# Patient Record
Sex: FEMALE | Race: WHITE | URBAN - NONMETROPOLITAN AREA
[De-identification: names, ages, dates, MRNs, and addresses within clinical notes are randomized per-mention and may not be internally consistent; named-entity substitution may affect disease eponyms.]

---

## 2021-06-30 ENCOUNTER — OFFICE VISIT (OUTPATIENT)
Dept: URBAN - NONMETROPOLITAN AREA CLINIC 4 | Facility: CLINIC | Age: 81
End: 2021-06-30
Payer: MEDICARE

## 2021-06-30 VITALS
TEMPERATURE: 97.5 F | HEIGHT: 64 IN | HEART RATE: 77 BPM | DIASTOLIC BLOOD PRESSURE: 77 MMHG | BODY MASS INDEX: 27.31 KG/M2 | WEIGHT: 160 LBS | SYSTOLIC BLOOD PRESSURE: 124 MMHG

## 2021-06-30 DIAGNOSIS — R19.7 DIARRHEA, UNSPECIFIED TYPE: ICD-10-CM

## 2021-06-30 DIAGNOSIS — R15.9 INCONTINENCE OF FECES, UNSPECIFIED FECAL INCONTINENCE TYPE: ICD-10-CM

## 2021-06-30 PROCEDURE — 99204 OFFICE O/P NEW MOD 45 MIN: CPT | Performed by: INTERNAL MEDICINE

## 2021-06-30 RX ORDER — PRAVASTATIN SODIUM 40 MG/1
1 TABLET TABLET ORAL ONCE A DAY
Status: ACTIVE | COMMUNITY

## 2021-06-30 RX ORDER — IBUPROFEN 200 MG
1 TABLET WITH MEALS CAPSULE ORAL TWICE A DAY
Status: ACTIVE | COMMUNITY

## 2021-06-30 RX ORDER — EZETIMIBE 10 MG/1
1 TABLET TABLET ORAL ONCE A DAY
Status: ACTIVE | COMMUNITY

## 2021-06-30 NOTE — HPI-TODAY'S VISIT:
6/30/21: Pt is a 81 yo female with PMH of HLD, colon polyps, s/p gastric surgery in 2004 who was referred by Dr Martinez for evaluation of changes in bowel habits.   Pt reports changes in bowel habits since the begininng of this year. Stools are more loose/liquid. Has anywhere from 1-5 BMs daily. She also reports urgency and fecal incontinence. Denies abd pain. Has occasional bloating. Denies excessive flatus. Denies hematochezia. She tried OTC probiotic, which did not seem to help. She also tried Metamucil for about a week and did not notice any improvement. Has not tried any anti-diarrheals.   She tried to provide a stool sample to PCP, but stool was too thick.   Had colonoscopy on 4/1/21 by Dr. Graves in [facility] that showed precancerous polyps.   FHx (mother) colon cancer Her granddaughter also has colon cancer

## 2021-07-03 LAB
ADENOVIRUS F 40/41: NOT DETECTED
ASTROVIRUS: NOT DETECTED
C DIFFICILE TOXIN A/B: NOT DETECTED
CAMPYLOBACTER: NOT DETECTED
CRYPTOSPORIDIUM: NOT DETECTED
CYCLOSPORA CAYETANENSIS: NOT DETECTED
E COLI O157: (no result)
ENTAMOEBA HISTOLYTICA: NOT DETECTED
ENTEROAGGREGATIVE E COLI: NOT DETECTED
ENTEROPATHOGENIC E COLI: NOT DETECTED
ENTEROTOXIGENIC E COLI: NOT DETECTED
GIARDIA LAMBLIA: NOT DETECTED
NOROVIRUS GI/GII: NOT DETECTED
PLESIOMONAS SHIGELLOIDES: NOT DETECTED
ROTAVIRUS A: NOT DETECTED
SALMONELLA: NOT DETECTED
SAPOVIRUS: NOT DETECTED
SHIGA-TOXIN-PRODUCING E COLI: NOT DETECTED
SHIGELLA/ENTEROINVASIVE E COLI: NOT DETECTED
VIBRIO CHOLERAE: NOT DETECTED
VIBRIO: NOT DETECTED
YERSINIA ENTEROCOLITICA: NOT DETECTED

## 2021-08-10 ENCOUNTER — OFFICE VISIT (OUTPATIENT)
Dept: URBAN - NONMETROPOLITAN AREA CLINIC 4 | Facility: CLINIC | Age: 81
End: 2021-08-10
Payer: MEDICARE

## 2021-08-10 ENCOUNTER — WEB ENCOUNTER (OUTPATIENT)
Dept: URBAN - NONMETROPOLITAN AREA CLINIC 4 | Facility: CLINIC | Age: 81
End: 2021-08-10

## 2021-08-10 DIAGNOSIS — R19.7 DIARRHEA, UNSPECIFIED TYPE: ICD-10-CM

## 2021-08-10 DIAGNOSIS — R15.9 INCONTINENCE OF FECES, UNSPECIFIED FECAL INCONTINENCE TYPE: ICD-10-CM

## 2021-08-10 PROCEDURE — 99213 OFFICE O/P EST LOW 20 MIN: CPT | Performed by: INTERNAL MEDICINE

## 2021-08-10 RX ORDER — IBUPROFEN 200 MG
1 TABLET WITH MEALS CAPSULE ORAL TWICE A DAY
Status: ACTIVE | COMMUNITY

## 2021-08-10 RX ORDER — PRAVASTATIN SODIUM 40 MG/1
1 TABLET TABLET ORAL ONCE A DAY
Status: ACTIVE | COMMUNITY

## 2021-08-10 RX ORDER — EZETIMIBE 10 MG/1
1 TABLET TABLET ORAL ONCE A DAY
Status: ACTIVE | COMMUNITY

## 2021-08-10 NOTE — HPI-TODAY'S VISIT:
6/30/21: Pt is a 79 yo female with PMH of HLD, colon polyps, s/p gastric surgery in 2004 who was referred by Dr Martinez for evaluation of changes in bowel habits.   Pt reports changes in bowel habits since the begininng of this year. Stools are more loose/liquid. Has anywhere from 1-5 BMs daily. She also reports urgency and fecal incontinence. Denies abd pain. Has occasional bloating. Denies excessive flatus. Denies hematochezia. She tried OTC probiotic, which did not seem to help. She also tried Metamucil for about a week and did not notice any improvement. Has not tried any anti-diarrheals.   She tried to provide a stool sample to PCP, but stool was too thick.   Had colonoscopy on 4/1/21 by Dr. Graves in [facility] that showed precancerous polyps.   FHx (mother) colon cancer Her granddaughter also has colon cancer  8/10/21: Pt RTC for f/u. GPP neg for infectious organisms. She is doing much better and denies any further incontinence. Stools are still loose. She takes Metamucil 2 Tb daily. Now typically having 1 BM daily.

## 2022-01-18 ENCOUNTER — OFFICE VISIT (OUTPATIENT)
Dept: URBAN - NONMETROPOLITAN AREA CLINIC 4 | Facility: CLINIC | Age: 82
End: 2022-01-18
Payer: MEDICARE

## 2022-01-18 VITALS
HEART RATE: 79 BPM | BODY MASS INDEX: 26.91 KG/M2 | SYSTOLIC BLOOD PRESSURE: 105 MMHG | DIASTOLIC BLOOD PRESSURE: 70 MMHG | WEIGHT: 157.6 LBS | TEMPERATURE: 97.5 F | HEIGHT: 64 IN

## 2022-01-18 DIAGNOSIS — R15.9 INCONTINENCE OF FECES, UNSPECIFIED FECAL INCONTINENCE TYPE: ICD-10-CM

## 2022-01-18 DIAGNOSIS — R19.7 DIARRHEA, UNSPECIFIED TYPE: ICD-10-CM

## 2022-01-18 DIAGNOSIS — R10.31 ABDOMINAL CRAMPING IN RIGHT LOWER QUADRANT: ICD-10-CM

## 2022-01-18 DIAGNOSIS — R10.32 ABDOMINAL CRAMPING IN LEFT LOWER QUADRANT: ICD-10-CM

## 2022-01-18 PROCEDURE — 99214 OFFICE O/P EST MOD 30 MIN: CPT | Performed by: REGISTERED NURSE

## 2022-01-18 RX ORDER — IBUPROFEN 200 MG
1 TABLET WITH MEALS CAPSULE ORAL TWICE A DAY
Status: ACTIVE | COMMUNITY

## 2022-01-18 RX ORDER — EZETIMIBE 10 MG/1
1 TABLET TABLET ORAL ONCE A DAY
Status: ACTIVE | COMMUNITY

## 2022-01-18 RX ORDER — PRAVASTATIN SODIUM 40 MG/1
1 TABLET TABLET ORAL ONCE A DAY
Status: ACTIVE | COMMUNITY

## 2022-01-18 NOTE — HPI-TODAY'S VISIT:
6/30/21: Pt is a 81 yo female with PMH of HLD, colon polyps, s/p gastric surgery in 2004 who was referred by Dr Martinez for evaluation of changes in bowel habits.   Pt reports changes in bowel habits since the begininng of this year. Stools are more loose/liquid. Has anywhere from 1-5 BMs daily. She also reports urgency and fecal incontinence. Denies abd pain. Has occasional bloating. Denies excessive flatus. Denies hematochezia. She tried OTC probiotic, which did not seem to help. She also tried Metamucil for about a week and did not notice any improvement. Has not tried any anti-diarrheals.   She tried to provide a stool sample to PCP, but stool was too thick.   Had colonoscopy on 4/1/21 by Dr. Graves in [facility] that showed precancerous polyps.   FHx (mother) colon cancer Her granddaughter also has colon cancer  8/10/21: Pt RTC for f/u. GPP neg for infectious organisms. She is doing much better and denies any further incontinence. Stools are still loose. She takes Metamucil 2 Tb daily. Now typically having 1 BM daily.  1/18/22: PT RTC for f/u. Since last OV, she has been having more freqeunt BMs with urgency and incontinence. She has anywhere from 3-5 BMs daily. She currently takes Metamucil 1-2 Tb daily. She has taken Imodium in the past with good response, but is not currently taking. Has occasional lower abdominal pain and bloating. Denies any hematochezia.

## 2022-01-23 LAB — PANCREATIC ELASTASE, FECAL: 123

## 2022-01-24 ENCOUNTER — TELEPHONE ENCOUNTER (OUTPATIENT)
Dept: URBAN - METROPOLITAN AREA CLINIC 92 | Facility: CLINIC | Age: 82
End: 2022-01-24

## 2022-01-24 RX ORDER — PANCRELIPASE 36000; 180000; 114000 [USP'U]/1; [USP'U]/1; [USP'U]/1
AS DIRECTED CAPSULE, DELAYED RELEASE PELLETS ORAL
Qty: 250 | Refills: 3 | OUTPATIENT
Start: 2022-01-24 | End: 2022-05-24

## 2022-04-18 ENCOUNTER — OFFICE VISIT (OUTPATIENT)
Dept: URBAN - NONMETROPOLITAN AREA CLINIC 4 | Facility: CLINIC | Age: 82
End: 2022-04-18

## 2022-05-02 ENCOUNTER — OFFICE VISIT (OUTPATIENT)
Dept: URBAN - NONMETROPOLITAN AREA CLINIC 4 | Facility: CLINIC | Age: 82
End: 2022-05-02
Payer: MEDICARE

## 2022-05-02 VITALS
SYSTOLIC BLOOD PRESSURE: 114 MMHG | TEMPERATURE: 97.3 F | BODY MASS INDEX: 27.25 KG/M2 | HEIGHT: 64 IN | DIASTOLIC BLOOD PRESSURE: 77 MMHG | WEIGHT: 159.6 LBS | HEART RATE: 97 BPM

## 2022-05-02 DIAGNOSIS — R19.7 DIARRHEA, UNSPECIFIED TYPE: ICD-10-CM

## 2022-05-02 DIAGNOSIS — R15.9 INCONTINENCE OF FECES, UNSPECIFIED FECAL INCONTINENCE TYPE: ICD-10-CM

## 2022-05-02 DIAGNOSIS — K86.89 PANCREATIC INSUFFICIENCY: ICD-10-CM

## 2022-05-02 PROCEDURE — 99214 OFFICE O/P EST MOD 30 MIN: CPT | Performed by: REGISTERED NURSE

## 2022-05-02 RX ORDER — EZETIMIBE 10 MG/1
1 TABLET TABLET ORAL ONCE A DAY
Status: ACTIVE | COMMUNITY

## 2022-05-02 RX ORDER — PRAVASTATIN SODIUM 40 MG/1
1 TABLET TABLET ORAL ONCE A DAY
Status: ACTIVE | COMMUNITY

## 2022-05-02 RX ORDER — IBUPROFEN 200 MG
1 TABLET WITH MEALS CAPSULE ORAL TWICE A DAY
Status: ACTIVE | COMMUNITY

## 2022-05-02 RX ORDER — PANCRELIPASE 36000; 180000; 114000 [USP'U]/1; [USP'U]/1; [USP'U]/1
AS DIRECTED CAPSULE, DELAYED RELEASE PELLETS ORAL
Qty: 250 | Refills: 3 | Status: ACTIVE | COMMUNITY

## 2022-05-02 RX ORDER — PANCRELIPASE 36000; 180000; 114000 [USP'U]/1; [USP'U]/1; [USP'U]/1
AS DIRECTED CAPSULE, DELAYED RELEASE PELLETS ORAL
Qty: 250 | Refills: 3
End: 2022-08-30

## 2022-05-02 NOTE — HPI-TODAY'S VISIT:
6/30/21: Pt is a 79 yo female with PMH of HLD, colon polyps, s/p gastric surgery in 2004 who was referred by Dr Martinez for evaluation of changes in bowel habits.   Pt reports changes in bowel habits since the begininng of this year. Stools are more loose/liquid. Has anywhere from 1-5 BMs daily. She also reports urgency and fecal incontinence. Denies abd pain. Has occasional bloating. Denies excessive flatus. Denies hematochezia. She tried OTC probiotic, which did not seem to help. She also tried Metamucil for about a week and did not notice any improvement. Has not tried any anti-diarrheals.   She tried to provide a stool sample to PCP, but stool was too thick.   Had colonoscopy on 4/1/21 by Dr. Graves in [facility] that showed precancerous polyps.   FHx (mother) colon cancer Her granddaughter also has colon cancer  8/10/21: Pt RTC for f/u. GPP neg for infectious organisms. She is doing much better and denies any further incontinence. Stools are still loose. She takes Metamucil 2 Tb daily. Now typically having 1 BM daily.  1/18/22: PT RTC for f/u. Since last OV, she has been having more freqeunt BMs with urgency and incontinence. She has anywhere from 3-5 BMs daily. She currently takes Metamucil 1-2 Tb daily. She has taken Imodium in the past with good response, but is not currently taking. Has occasional lower abdominal pain and bloating. Denies any hematochezia.  5/2/22: Pt RTC for f/u. Stool studies showed EPI. She continues to c/o loose stools with incontinence. She only took Creon samples for a week and noticed improvement, but never received Rx. She reports abdominal pain and bloating. Denies hematochezia. She is currently taking 2 tsp Metamucil daily which helps. She has been following low FODMAP diet.

## 2022-05-09 ENCOUNTER — TELEPHONE ENCOUNTER (OUTPATIENT)
Dept: URBAN - NONMETROPOLITAN AREA CLINIC 4 | Facility: CLINIC | Age: 82
End: 2022-05-09

## 2022-05-09 RX ORDER — PANCRELIPASE 36000; 180000; 114000 [USP'U]/1; [USP'U]/1; [USP'U]/1
AS DIRECTED CAPSULE, DELAYED RELEASE PELLETS ORAL
Qty: 250 | Refills: 3
End: 2022-09-06

## 2022-05-31 ENCOUNTER — TELEPHONE ENCOUNTER (OUTPATIENT)
Dept: URBAN - NONMETROPOLITAN AREA CLINIC 4 | Facility: CLINIC | Age: 82
End: 2022-05-31

## 2022-08-08 ENCOUNTER — DASHBOARD ENCOUNTERS (OUTPATIENT)
Age: 82
End: 2022-08-08

## 2022-08-08 ENCOUNTER — OFFICE VISIT (OUTPATIENT)
Dept: URBAN - NONMETROPOLITAN AREA CLINIC 4 | Facility: CLINIC | Age: 82
End: 2022-08-08
Payer: MEDICARE

## 2022-08-08 VITALS
TEMPERATURE: 97.7 F | HEIGHT: 64 IN | DIASTOLIC BLOOD PRESSURE: 77 MMHG | SYSTOLIC BLOOD PRESSURE: 112 MMHG | WEIGHT: 157.8 LBS | BODY MASS INDEX: 26.94 KG/M2 | HEART RATE: 82 BPM

## 2022-08-08 DIAGNOSIS — K86.89 PANCREATIC INSUFFICIENCY: ICD-10-CM

## 2022-08-08 DIAGNOSIS — R15.9 INCONTINENCE OF FECES, UNSPECIFIED FECAL INCONTINENCE TYPE: ICD-10-CM

## 2022-08-08 DIAGNOSIS — R19.7 DIARRHEA, UNSPECIFIED TYPE: ICD-10-CM

## 2022-08-08 PROBLEM — 72042002: Status: ACTIVE | Noted: 2021-06-30

## 2022-08-08 PROCEDURE — 99214 OFFICE O/P EST MOD 30 MIN: CPT | Performed by: REGISTERED NURSE

## 2022-08-08 RX ORDER — PANCRELIPASE 36000; 180000; 114000 [USP'U]/1; [USP'U]/1; [USP'U]/1
AS DIRECTED CAPSULE, DELAYED RELEASE PELLETS ORAL
Qty: 250 | Refills: 3 | Status: DISCONTINUED | COMMUNITY
End: 2022-09-06

## 2022-08-08 RX ORDER — EZETIMIBE 10 MG/1
1 TABLET TABLET ORAL ONCE A DAY
Status: ACTIVE | COMMUNITY

## 2022-08-08 RX ORDER — IBUPROFEN 200 MG
1 TABLET WITH MEALS CAPSULE ORAL TWICE A DAY
Status: ACTIVE | COMMUNITY

## 2022-08-08 RX ORDER — PRAVASTATIN SODIUM 40 MG/1
1 TABLET TABLET ORAL ONCE A DAY
Status: ACTIVE | COMMUNITY

## 2022-08-08 NOTE — HPI-TODAY'S VISIT:
6/30/21: Pt is a 81 yo female with PMH of HLD, colon polyps, s/p gastric surgery in 2004 who was referred by Dr Martinez for evaluation of changes in bowel habits.   Pt reports changes in bowel habits since the begininng of this year. Stools are more loose/liquid. Has anywhere from 1-5 BMs daily. She also reports urgency and fecal incontinence. Denies abd pain. Has occasional bloating. Denies excessive flatus. Denies hematochezia. She tried OTC probiotic, which did not seem to help. She also tried Metamucil for about a week and did not notice any improvement. Has not tried any anti-diarrheals.   She tried to provide a stool sample to PCP, but stool was too thick.   Had colonoscopy on 4/1/21 by Dr. Graves in [facility] that showed precancerous polyps.   FHx (mother) colon cancer Her granddaughter also has colon cancer  8/10/21: Pt RTC for f/u. GPP neg for infectious organisms. She is doing much better and denies any further incontinence. Stools are still loose. She takes Metamucil 2 Tb daily. Now typically having 1 BM daily.  1/18/22: PT RTC for f/u. Since last OV, she has been having more freqeunt BMs with urgency and incontinence. She has anywhere from 3-5 BMs daily. She currently takes Metamucil 1-2 Tb daily. She has taken Imodium in the past with good response, but is not currently taking. Has occasional lower abdominal pain and bloating. Denies any hematochezia.  5/2/22: Pt RTC for f/u. Stool studies showed EPI. She continues to c/o loose stools with incontinence. She only took Creon samples for a week and noticed improvement, but never received Rx. She reports abdominal pain and bloating. Denies hematochezia. She is currently taking 2 tsp Metamucil daily which helps. She has been following low FODMAP diet.  8/8/22: Pt RTC for f/u. She reports worsening diarrhea, incontinence, gas and bloating since starting Creon, so she stopped taking back in June. She denies any current GI symptoms. She fell and had right hip surgery last month and is recovering from that.